# Patient Record
Sex: FEMALE | Race: WHITE | HISPANIC OR LATINO | URBAN - METROPOLITAN AREA
[De-identification: names, ages, dates, MRNs, and addresses within clinical notes are randomized per-mention and may not be internally consistent; named-entity substitution may affect disease eponyms.]

---

## 2023-06-30 ENCOUNTER — APPOINTMENT (RX ONLY)
Dept: URBAN - METROPOLITAN AREA CLINIC 90 | Facility: CLINIC | Age: 63
Setting detail: DERMATOLOGY
End: 2023-06-30

## 2023-06-30 DIAGNOSIS — L91.0 HYPERTROPHIC SCAR: ICD-10-CM | Status: INADEQUATELY CONTROLLED

## 2023-06-30 PROCEDURE — ? COUNSELING

## 2023-06-30 PROCEDURE — ? PRESCRIPTION

## 2023-06-30 PROCEDURE — 99202 OFFICE O/P NEW SF 15 MIN: CPT

## 2023-06-30 PROCEDURE — ? PRESCRIPTION MEDICATION MANAGEMENT

## 2023-06-30 RX ORDER — CLOBETASOL PROPIONATE 0.5 MG/G
OINTMENT TOPICAL BID
Qty: 60 | Refills: 3 | Status: ERX | COMMUNITY
Start: 2023-06-30

## 2023-06-30 RX ADMIN — CLOBETASOL PROPIONATE: 0.5 OINTMENT TOPICAL at 00:00

## 2023-06-30 ASSESSMENT — LOCATION SIMPLE DESCRIPTION DERM
LOCATION SIMPLE: LEFT KNEE
LOCATION SIMPLE: RIGHT SHOULDER
LOCATION SIMPLE: LEFT PRETIBIAL REGION
LOCATION SIMPLE: RIGHT KNEE
LOCATION SIMPLE: RIGHT WRIST

## 2023-06-30 ASSESSMENT — LOCATION ZONE DERM
LOCATION ZONE: LEG
LOCATION ZONE: ARM

## 2023-06-30 ASSESSMENT — LOCATION DETAILED DESCRIPTION DERM
LOCATION DETAILED: LEFT DISTAL PRETIBIAL REGION
LOCATION DETAILED: RIGHT DORSAL WRIST
LOCATION DETAILED: RIGHT POSTERIOR SHOULDER
LOCATION DETAILED: LEFT KNEE
LOCATION DETAILED: RIGHT KNEE

## 2023-06-30 NOTE — PROCEDURE: PRESCRIPTION MEDICATION MANAGEMENT
Plan: Follow up PRN
Initiate Treatment: clobetasol 0.05 % topical ointment Bid
Detail Level: Zone
Render In Strict Bullet Format?: No